# Patient Record
Sex: MALE | URBAN - METROPOLITAN AREA
[De-identification: names, ages, dates, MRNs, and addresses within clinical notes are randomized per-mention and may not be internally consistent; named-entity substitution may affect disease eponyms.]

---

## 2023-03-13 ENCOUNTER — TRANSCRIBE ORDER (OUTPATIENT)
Dept: SCHEDULING | Age: 33
End: 2023-03-13

## 2023-03-13 DIAGNOSIS — R74.8 ELEVATED LIVER ENZYMES: Primary | ICD-10-CM

## 2023-03-20 ENCOUNTER — HOSPITAL ENCOUNTER (OUTPATIENT)
Dept: ULTRASOUND IMAGING | Age: 33
Discharge: HOME | End: 2023-03-20
Attending: NURSE PRACTITIONER
Payer: COMMERCIAL

## 2023-03-20 DIAGNOSIS — R74.8 ELEVATED LIVER ENZYMES: ICD-10-CM

## 2023-03-20 PROCEDURE — 76705 ECHO EXAM OF ABDOMEN: CPT

## 2025-04-09 ENCOUNTER — OFFICE VISIT (OUTPATIENT)
Age: 35
End: 2025-04-09

## 2025-04-09 VITALS
BODY MASS INDEX: 39.27 KG/M2 | HEART RATE: 90 BPM | WEIGHT: 306 LBS | DIASTOLIC BLOOD PRESSURE: 70 MMHG | SYSTOLIC BLOOD PRESSURE: 109 MMHG | HEIGHT: 74 IN | OXYGEN SATURATION: 97 % | TEMPERATURE: 98.8 F | RESPIRATION RATE: 16 BRPM

## 2025-04-09 DIAGNOSIS — L23.9 ALLERGIC DERMATITIS: Primary | ICD-10-CM

## 2025-04-09 RX ORDER — DOXYCYCLINE HYCLATE 100 MG
100 TABLET ORAL 2 TIMES DAILY
COMMUNITY
Start: 2025-03-24 | End: 2025-09-08

## 2025-04-09 RX ORDER — DEXAMETHASONE SODIUM PHOSPHATE 10 MG/ML
10 INJECTION, SOLUTION INTRA-ARTICULAR; INTRALESIONAL; INTRAMUSCULAR; INTRAVENOUS; SOFT TISSUE ONCE
Status: COMPLETED | OUTPATIENT
Start: 2025-04-09 | End: 2025-04-09

## 2025-04-09 RX ORDER — PROPRANOLOL HYDROCHLORIDE 40 MG/1
40 TABLET ORAL 2 TIMES DAILY PRN
COMMUNITY
Start: 2025-03-12 | End: 2026-03-12

## 2025-04-09 RX ORDER — LISINOPRIL 20 MG/1
20 TABLET ORAL DAILY
COMMUNITY
Start: 2025-04-06 | End: 2025-05-06

## 2025-04-09 RX ORDER — CHLORHEXIDINE GLUCONATE 40 MG/ML
SOLUTION TOPICAL DAILY PRN
COMMUNITY
Start: 2025-01-27

## 2025-04-09 RX ADMIN — DEXAMETHASONE SODIUM PHOSPHATE 10 MG: 10 INJECTION, SOLUTION INTRA-ARTICULAR; INTRALESIONAL; INTRAMUSCULAR; INTRAVENOUS; SOFT TISSUE at 12:18

## 2025-04-09 NOTE — PROGRESS NOTES
Normal breath sounds. No wheezing, rhonchi or rales.   Skin:     General: Skin is warm and dry.      Capillary Refill: Capillary refill takes less than 2 seconds.      Findings: Erythema and rash present.      Comments: Multiple hypopigmented tiny bumps predominantly around fingernails but also seen on the palms as well as around his toes bilaterally (please see picture).  On the toes the lesions are visible on the tips of the toes and around the toenails.  They do not appear to be fluid-filled.  They do not appear to be pustules.  No breaks in skin, no bleeding, no drainage.  They are not tender to palpation.  Sensation intact.  Brisk cap refill.   Neurological:      General: No focal deficit present.      Mental Status: He is alert and oriented to person, place, and time.      Sensory: No sensory deficit.   Psychiatric:         Mood and Affect: Mood normal.         Behavior: Behavior normal.         Thought Content: Thought content normal.         Judgment: Judgment normal.              ASSESSMENT/PLAN:  1. Allergic dermatitis  -     dexAMETHasone (DECADRON) injection 10 mg; 10 mg, IntraMUSCular, ONCE, 1 dose, On Wed 4/9/25 at 1230      Dermatitis - likely allergic  Patient well-appearing, vitals stable, he is in no distress  Dexamethasone injection given in clinic today, tolerated well with no adverse reaction  Recommend continue your daily Zyrtec, can add a dose of Benadryl at night  Recommend over-the-counter anti-itch and calming creams as needed  Recommend follow-up with your dermatologist  Call or return to clinic if any concerns  Patient comfortable with plan     Reviewed case with Dr. Bills      An electronic signature was used to authenticate this note.    Nathalie Wilkins PA-C

## 2025-04-09 NOTE — PATIENT INSTRUCTIONS
Dermatitis - likely allergic  Dexamethasone injection given in clinic today  Recommend continue your daily Zyrtec, can add a dose of Benadryl at night  Recommend over-the-counter anti-itch and calming creams as needed  Recommend follow-up with your dermatologist  Call or return to clinic if any concerns